# Patient Record
Sex: MALE | Race: WHITE | NOT HISPANIC OR LATINO | Employment: UNEMPLOYED | URBAN - NONMETROPOLITAN AREA
[De-identification: names, ages, dates, MRNs, and addresses within clinical notes are randomized per-mention and may not be internally consistent; named-entity substitution may affect disease eponyms.]

---

## 2022-05-12 ENCOUNTER — OFFICE VISIT (OUTPATIENT)
Dept: FAMILY MEDICINE | Facility: CLINIC | Age: 32
End: 2022-05-12

## 2022-05-12 VITALS
WEIGHT: 267 LBS | BODY MASS INDEX: 41.91 KG/M2 | HEART RATE: 81 BPM | DIASTOLIC BLOOD PRESSURE: 88 MMHG | OXYGEN SATURATION: 97 % | SYSTOLIC BLOOD PRESSURE: 138 MMHG | TEMPERATURE: 98 F | HEIGHT: 67 IN | RESPIRATION RATE: 18 BRPM

## 2022-05-12 DIAGNOSIS — J01.01 ACUTE RECURRENT MAXILLARY SINUSITIS: Primary | ICD-10-CM

## 2022-05-12 PROCEDURE — 99203 PR OFFICE/OUTPT VISIT, NEW, LEVL III, 30-44 MIN: ICD-10-PCS | Mod: 25,,, | Performed by: NURSE PRACTITIONER

## 2022-05-12 PROCEDURE — 96372 THER/PROPH/DIAG INJ SC/IM: CPT | Mod: ,,, | Performed by: NURSE PRACTITIONER

## 2022-05-12 PROCEDURE — 96372 PR INJECTION,THERAP/PROPH/DIAG2ST, IM OR SUBCUT: ICD-10-PCS | Mod: ,,, | Performed by: NURSE PRACTITIONER

## 2022-05-12 PROCEDURE — 99203 OFFICE O/P NEW LOW 30 MIN: CPT | Mod: 25,,, | Performed by: NURSE PRACTITIONER

## 2022-05-12 RX ORDER — CEFTRIAXONE 1 G/1
1 INJECTION, POWDER, FOR SOLUTION INTRAMUSCULAR; INTRAVENOUS ONCE
Status: COMPLETED | OUTPATIENT
Start: 2022-05-12 | End: 2022-05-12

## 2022-05-12 RX ORDER — AZITHROMYCIN 250 MG/1
TABLET, FILM COATED ORAL
Qty: 6 TABLET | Refills: 0 | Status: SHIPPED | OUTPATIENT
Start: 2022-05-12 | End: 2022-05-17

## 2022-05-12 RX ORDER — DEXAMETHASONE SODIUM PHOSPHATE 4 MG/ML
4 INJECTION, SOLUTION INTRA-ARTICULAR; INTRALESIONAL; INTRAMUSCULAR; INTRAVENOUS; SOFT TISSUE
Status: COMPLETED | OUTPATIENT
Start: 2022-05-12 | End: 2022-05-12

## 2022-05-12 RX ADMIN — CEFTRIAXONE 1 G: 1 INJECTION, POWDER, FOR SOLUTION INTRAMUSCULAR; INTRAVENOUS at 10:05

## 2022-05-12 RX ADMIN — DEXAMETHASONE SODIUM PHOSPHATE 4 MG: 4 INJECTION, SOLUTION INTRA-ARTICULAR; INTRALESIONAL; INTRAMUSCULAR; INTRAVENOUS; SOFT TISSUE at 10:05

## 2022-05-12 NOTE — PROGRESS NOTES
JEFFERY Gonzalez   [place] [id number] [facility] [address]  [place]  [address]      PATIENT NAME: Sal Brown  : 1990  DATE: 22  MRN: [id number]      Billing Provider: JEFFERY Gonzalez  Level of Service:   Patient PCP Information     Provider PCP Type    JEFFERY Gonzalez General          Reason for Visit / Chief Complaint: Sinus Problem (Got sick .  Sinus drainage and sore throat.) and Nasal Congestion       Update PCP  Update Chief Complaint         History of Present Illness / Problem Focused Workflow     Sal Brown presents to the clinic with Sinus Problem (Got sick .  Sinus drainage and sore throat.) and Nasal Congestion     Patient presents to clinic with c/o sinus drainage, intermittent low grade fever, ear drainage, fatigue. Patient has taken dayquil and nyquil with minimal improvement in symtposm.       Review of Systems     @Review of Systems   HENT: Positive for nasal congestion, ear discharge, postnasal drip, rhinorrhea, sinus pressure/congestion and sore throat. Negative for ear pain.    Respiratory: Positive for cough.    Cardiovascular: Negative for chest pain.   Neurological: Positive for headaches.       Medical / Social / Family History     Past Medical History:   Diagnosis Date    Asthma        Past Surgical History:   Procedure Laterality Date    TONSILLECTOMY      WISDOM TOOTH EXTRACTION         Social History    reports that he has never smoked. He has never used smokeless tobacco. He reports current alcohol use. He reports previous drug use.    Family History  's family history includes No Known Problems in his father and mother.    Medications and Allergies     Medications  No outpatient medications have been marked as taking for the 22 encounter (Office Visit) with JEFFERY Gonzalez.     Current Facility-Administered Medications for the 22 encounter (Office Visit) with JEFFERY Gonzalez   Medication Dose Route Frequency Provider Last Rate  Last Admin    [COMPLETED] cefTRIAXone injection 1 g  1 g Intramuscular Once JEFFERY Gonzalez   1 g at 05/12/22 1033    [COMPLETED] dexamethasone injection 4 mg  4 mg Intramuscular 1 time in Clinic/HOD JEFFERY Gonzalez   4 mg at 05/12/22 1033       Allergies  Review of patient's allergies indicates:  No Known Allergies    Physical Examination     Vitals:    05/12/22 0959   BP: 138/88   Pulse: 81   Resp: 18   Temp: 97.6 °F (36.4 °C)     Physical Exam  Constitutional:       General: He is not in acute distress.     Appearance: Normal appearance.   HENT:      Right Ear: Tympanic membrane is erythematous and bulging.      Left Ear: Tympanic membrane is erythematous and bulging.      Mouth/Throat:      Pharynx: Posterior oropharyngeal erythema present.   Cardiovascular:      Rate and Rhythm: Normal rate and regular rhythm.   Pulmonary:      Effort: Pulmonary effort is normal. No respiratory distress.      Breath sounds: Normal breath sounds. No wheezing, rhonchi or rales.   Skin:     General: Skin is warm and dry.   Neurological:      Mental Status: He is alert.   Psychiatric:         Mood and Affect: Mood normal.         Behavior: Behavior normal.               No results found for: WBC, HGB, HCT, MCV, PLT       No results found for: NA, K, CL, CO2, GLU, BUN, CREATININE, CALCIUM, PROT, ALBUMIN, BILITOT, ALKPHOS, AST, ALT, ANIONGAP, ESTGFRAFRICA, EGFRNONAA   No image results found.     Procedures   Assessment and Plan (including Health Maintenance)      Problem List  Smart Sets  Document Outside HM   :    Plan:           Problem List Items Addressed This Visit    None     Visit Diagnoses     Acute recurrent maxillary sinusitis    -  Primary    Relevant Medications    cefTRIAXone injection 1 g (Completed)    dexamethasone injection 4 mg (Completed)    azithromycin (Z-ALYSSA) 250 MG tablet          Health Maintenance Topics with due status: Not Due       Topic Last Completion Date    Influenza Vaccine Not Due       No  future appointments.     Health Maintenance Due   Topic Date Due    Lipid Panel  Never done    TETANUS VACCINE  Never done        Follow up if symptoms worsen or fail to improve.     Signature:  France Gastelum AMOS  [facility]  [address] [address]  [address]    Date of encounter: 5/12/22